# Patient Record
Sex: FEMALE | Race: BLACK OR AFRICAN AMERICAN | NOT HISPANIC OR LATINO
[De-identification: names, ages, dates, MRNs, and addresses within clinical notes are randomized per-mention and may not be internally consistent; named-entity substitution may affect disease eponyms.]

---

## 2018-08-23 PROBLEM — Z00.00 ENCOUNTER FOR PREVENTIVE HEALTH EXAMINATION: Status: ACTIVE | Noted: 2018-08-23

## 2018-08-24 ENCOUNTER — APPOINTMENT (OUTPATIENT)
Dept: PULMONOLOGY | Facility: CLINIC | Age: 63
End: 2018-08-24
Payer: MEDICARE

## 2018-08-24 VITALS
OXYGEN SATURATION: 99 % | DIASTOLIC BLOOD PRESSURE: 80 MMHG | TEMPERATURE: 98.6 F | SYSTOLIC BLOOD PRESSURE: 106 MMHG | BODY MASS INDEX: 45.99 KG/M2 | WEIGHT: 293 LBS | HEART RATE: 77 BPM | HEIGHT: 67 IN

## 2018-08-24 DIAGNOSIS — Z82.5 FAMILY HISTORY OF ASTHMA AND OTHER CHRONIC LOWER RESPIRATORY DISEASES: ICD-10-CM

## 2018-08-24 PROCEDURE — 99204 OFFICE O/P NEW MOD 45 MIN: CPT | Mod: 25

## 2018-08-24 PROCEDURE — 94060 EVALUATION OF WHEEZING: CPT

## 2018-08-24 PROCEDURE — 94727 GAS DIL/WSHOT DETER LNG VOL: CPT

## 2018-08-24 PROCEDURE — 95012 NITRIC OXIDE EXP GAS DETER: CPT

## 2018-08-24 PROCEDURE — ZZZZZ: CPT

## 2018-08-24 PROCEDURE — 94729 DIFFUSING CAPACITY: CPT

## 2018-08-29 ENCOUNTER — APPOINTMENT (OUTPATIENT)
Dept: PULMONOLOGY | Facility: CLINIC | Age: 63
End: 2018-08-29
Payer: MEDICARE

## 2018-08-29 PROCEDURE — 94070 EVALUATION OF WHEEZING: CPT

## 2018-08-29 PROCEDURE — 95070 INHLJ BRNCL CHALLENGE TSTG: CPT

## 2018-08-29 PROCEDURE — 71046 X-RAY EXAM CHEST 2 VIEWS: CPT

## 2018-09-26 ENCOUNTER — APPOINTMENT (OUTPATIENT)
Dept: BARIATRICS | Facility: CLINIC | Age: 63
End: 2018-09-26

## 2018-09-26 DIAGNOSIS — J45.909 UNSPECIFIED ASTHMA, UNCOMPLICATED: ICD-10-CM

## 2018-10-01 ENCOUNTER — APPOINTMENT (OUTPATIENT)
Dept: PULMONOLOGY | Facility: CLINIC | Age: 63
End: 2018-10-01

## 2018-10-03 ENCOUNTER — APPOINTMENT (OUTPATIENT)
Dept: BARIATRICS | Facility: CLINIC | Age: 63
End: 2018-10-03
Payer: MEDICARE

## 2018-10-03 VITALS
SYSTOLIC BLOOD PRESSURE: 135 MMHG | TEMPERATURE: 97.9 F | HEART RATE: 73 BPM | BODY MASS INDEX: 47.09 KG/M2 | HEIGHT: 66 IN | WEIGHT: 293 LBS | DIASTOLIC BLOOD PRESSURE: 84 MMHG | OXYGEN SATURATION: 98 %

## 2018-10-03 DIAGNOSIS — R06.02 SHORTNESS OF BREATH: ICD-10-CM

## 2018-10-03 DIAGNOSIS — M54.5 LOW BACK PAIN: ICD-10-CM

## 2018-10-03 DIAGNOSIS — Z80.3 FAMILY HISTORY OF MALIGNANT NEOPLASM OF BREAST: ICD-10-CM

## 2018-10-03 DIAGNOSIS — M19.90 UNSPECIFIED OSTEOARTHRITIS, UNSPECIFIED SITE: ICD-10-CM

## 2018-10-03 DIAGNOSIS — Z87.19 PERSONAL HISTORY OF OTHER DISEASES OF THE DIGESTIVE SYSTEM: ICD-10-CM

## 2018-10-03 DIAGNOSIS — Z83.49 FAMILY HISTORY OF OTHER ENDOCRINE, NUTRITIONAL AND METABOLIC DISEASES: ICD-10-CM

## 2018-10-03 DIAGNOSIS — Z83.3 FAMILY HISTORY OF DIABETES MELLITUS: ICD-10-CM

## 2018-10-03 DIAGNOSIS — Z82.49 FAMILY HISTORY OF ISCHEMIC HEART DISEASE AND OTHER DISEASES OF THE CIRCULATORY SYSTEM: ICD-10-CM

## 2018-10-03 DIAGNOSIS — Z83.6 FAMILY HISTORY OF OTHER DISEASES OF THE RESPIRATORY SYSTEM: ICD-10-CM

## 2018-10-03 DIAGNOSIS — Z87.09 PERSONAL HISTORY OF OTHER DISEASES OF THE RESPIRATORY SYSTEM: ICD-10-CM

## 2018-10-03 DIAGNOSIS — I83.93 ASYMPTOMATIC VARICOSE VEINS OF BILATERAL LOWER EXTREMITIES: ICD-10-CM

## 2018-10-03 DIAGNOSIS — M25.569 PAIN IN UNSPECIFIED KNEE: ICD-10-CM

## 2018-10-03 DIAGNOSIS — M79.89 OTHER SPECIFIED SOFT TISSUE DISORDERS: ICD-10-CM

## 2018-10-03 PROCEDURE — 99213 OFFICE O/P EST LOW 20 MIN: CPT

## 2018-11-29 ENCOUNTER — APPOINTMENT (OUTPATIENT)
Dept: ENDOCRINOLOGY | Facility: CLINIC | Age: 63
End: 2018-11-29
Payer: MEDICARE

## 2018-11-29 VITALS
BODY MASS INDEX: 47.09 KG/M2 | HEIGHT: 66 IN | SYSTOLIC BLOOD PRESSURE: 133 MMHG | HEART RATE: 93 BPM | DIASTOLIC BLOOD PRESSURE: 73 MMHG | WEIGHT: 293 LBS

## 2018-11-29 DIAGNOSIS — R03.0 ELEVATED BLOOD-PRESSURE READING, W/OUT DIAGNOSIS OF HYPERTENSION: ICD-10-CM

## 2018-11-29 PROCEDURE — 82962 GLUCOSE BLOOD TEST: CPT

## 2018-11-29 PROCEDURE — 99204 OFFICE O/P NEW MOD 45 MIN: CPT | Mod: 25

## 2018-11-29 PROCEDURE — 83036 HEMOGLOBIN GLYCOSYLATED A1C: CPT | Mod: QW

## 2018-11-30 PROBLEM — R03.0 ELEVATED BLOOD PRESSURE READING: Status: ACTIVE | Noted: 2018-11-30

## 2018-11-30 LAB
GLUCOSE BLDC GLUCOMTR-MCNC: 172
HBA1C MFR BLD HPLC: 5.9

## 2018-12-10 ENCOUNTER — OUTPATIENT (OUTPATIENT)
Dept: OUTPATIENT SERVICES | Facility: HOSPITAL | Age: 63
LOS: 1 days | End: 2018-12-10
Payer: MEDICARE

## 2018-12-10 DIAGNOSIS — J45.909 UNSPECIFIED ASTHMA, UNCOMPLICATED: ICD-10-CM

## 2018-12-10 PROCEDURE — 94070 EVALUATION OF WHEEZING: CPT

## 2018-12-10 PROCEDURE — 94070 EVALUATION OF WHEEZING: CPT | Mod: 26

## 2019-10-28 ENCOUNTER — APPOINTMENT (OUTPATIENT)
Dept: GASTROENTEROLOGY | Facility: CLINIC | Age: 64
End: 2019-10-28
Payer: MEDICARE

## 2019-10-28 VITALS
DIASTOLIC BLOOD PRESSURE: 82 MMHG | SYSTOLIC BLOOD PRESSURE: 124 MMHG | WEIGHT: 293 LBS | HEIGHT: 66 IN | HEART RATE: 73 BPM | OXYGEN SATURATION: 98 % | RESPIRATION RATE: 14 BRPM | BODY MASS INDEX: 47.09 KG/M2

## 2019-10-28 DIAGNOSIS — R10.9 UNSPECIFIED ABDOMINAL PAIN: ICD-10-CM

## 2019-10-28 DIAGNOSIS — Z12.11 ENCOUNTER FOR SCREENING FOR MALIGNANT NEOPLASM OF COLON: ICD-10-CM

## 2019-10-28 DIAGNOSIS — Z12.12 ENCOUNTER FOR SCREENING FOR MALIGNANT NEOPLASM OF COLON: ICD-10-CM

## 2019-10-28 PROCEDURE — 99204 OFFICE O/P NEW MOD 45 MIN: CPT

## 2019-10-29 NOTE — ASSESSMENT
[FreeTextEntry1] : 64yo F with WHO Class III Obesity presenting for evaluation of abdominal pain\par \par 1. Abdominal pain - Unclear etiology as patient unable to clearly verbalize symptoms. Seems to have systemic impact when these episodes occur. \par - H. pylori breath test\par - Trial of IB Guard\par \par 2. Colorectal Cancer Screening - Patient with family history, last colonoscopy >5 years ago. Given medical comorbidities, plan to pursue non-invasive testing\par - Cologuard ordered\par \par RTC after Cologuard results

## 2019-10-29 NOTE — HISTORY OF PRESENT ILLNESS
[de-identified] : Patient is a 64yo F presenting for evaluation of abdominal pain. Patient states that she has episodes of pain all over her body, including her back, knees, arms, associated with chills. No single focus in the abdomen that she can point to, no nausea or vomiting, no diarrhea or constipation. Patient denies melena or hematochezia, no change in stool frequency or consistency. Patient has taken Tylenol which helps these episodes. Patient otherwise iterates a history of colorectal cancer in her father who also had lymphoma and prostate cancer. No other GI malignancies in the family that she is aware of. Patient had been undergoing evaluation for potential bariatric surgery, but does not like the idea of rapid weight loss. Patient understands the potential health benefits, but is still hesitant.

## 2019-10-29 NOTE — PHYSICAL EXAM
[General Appearance - Alert] : alert [General Appearance - In No Acute Distress] : in no acute distress [Sclera] : the sclera and conjunctiva were normal [PERRL With Normal Accommodation] : pupils were equal in size, round, and reactive to light [Extraocular Movements] : extraocular movements were intact [Outer Ear] : the ears and nose were normal in appearance [Hearing Threshold Finger Rub Not Hernando] : hearing was normal [Examination Of The Oral Cavity] : the lips and gums were normal [Neck Cervical Mass (___cm)] : no neck mass was observed [Jugular Venous Distention Increased] : there was no jugular-venous distention [] : no respiratory distress [Respiration, Rhythm And Depth] : normal respiratory rhythm and effort [Exaggerated Use Of Accessory Muscles For Inspiration] : no accessory muscle use [Heart Rate And Rhythm] : heart rate was normal and rhythm regular [Abdomen Soft] : soft [Abdomen Tenderness] : non-tender [Nail Clubbing] : no clubbing  or cyanosis of the fingernails [Involuntary Movements] : no involuntary movements were seen [Skin Color & Pigmentation] : normal skin color and pigmentation [Skin Turgor] : normal skin turgor [No Focal Deficits] : no focal deficits [Oriented To Time, Place, And Person] : oriented to person, place, and time [Impaired Insight] : insight and judgment were intact [FreeTextEntry1] : Ambulates with rolling walker

## 2019-10-30 LAB — UREA BREATH TEST QL: NEGATIVE

## 2019-10-31 ENCOUNTER — RESULT REVIEW (OUTPATIENT)
Age: 64
End: 2019-10-31

## 2019-11-04 ENCOUNTER — OTHER (OUTPATIENT)
Age: 64
End: 2019-11-04

## 2019-11-15 ENCOUNTER — TRANSCRIPTION ENCOUNTER (OUTPATIENT)
Age: 64
End: 2019-11-15

## 2019-11-22 ENCOUNTER — APPOINTMENT (OUTPATIENT)
Dept: SLEEP CENTER | Facility: HOME HEALTH | Age: 64
End: 2019-11-22

## 2020-01-03 ENCOUNTER — OTHER (OUTPATIENT)
Age: 65
End: 2020-01-03

## 2021-04-29 ENCOUNTER — RESULT CHARGE (OUTPATIENT)
Age: 66
End: 2021-04-29

## 2021-04-29 ENCOUNTER — APPOINTMENT (OUTPATIENT)
Dept: ENDOCRINOLOGY | Facility: CLINIC | Age: 66
End: 2021-04-29
Payer: MEDICARE

## 2021-04-29 VITALS
WEIGHT: 293 LBS | HEART RATE: 76 BPM | BODY MASS INDEX: 47.09 KG/M2 | SYSTOLIC BLOOD PRESSURE: 133 MMHG | DIASTOLIC BLOOD PRESSURE: 79 MMHG | HEIGHT: 66 IN

## 2021-04-29 DIAGNOSIS — Z80.7 FAMILY HISTORY OF OTHER MALIGNANT NEOPLASMS OF LYMPHOID, HEMATOPOIETIC AND RELATED TISSUES: ICD-10-CM

## 2021-04-29 DIAGNOSIS — Z82.3 FAMILY HISTORY OF STROKE: ICD-10-CM

## 2021-04-29 LAB
ALBUMIN SERPL ELPH-MCNC: 4.4 G/DL
ALP BLD-CCNC: 89 U/L
ALT SERPL-CCNC: 17 U/L
ANION GAP SERPL CALC-SCNC: 10 MMOL/L
AST SERPL-CCNC: 16 U/L
BASOPHILS # BLD AUTO: 0.05 K/UL
BASOPHILS NFR BLD AUTO: 0.9 %
BILIRUB SERPL-MCNC: 0.3 MG/DL
BUN SERPL-MCNC: 15 MG/DL
CALCIUM SERPL-MCNC: 9.7 MG/DL
CHLORIDE SERPL-SCNC: 105 MMOL/L
CHOLEST SERPL-MCNC: 186 MG/DL
CO2 SERPL-SCNC: 28 MMOL/L
CREAT SERPL-MCNC: 0.65 MG/DL
EOSINOPHIL # BLD AUTO: 0.13 K/UL
EOSINOPHIL NFR BLD AUTO: 2.3 %
GLUCOSE BLDC GLUCOMTR-MCNC: 95
GLUCOSE SERPL-MCNC: 107 MG/DL
HBA1C MFR BLD HPLC: 5.8
HCT VFR BLD CALC: 41.1 %
HDLC SERPL-MCNC: 61 MG/DL
HGB BLD-MCNC: 13.2 G/DL
IMM GRANULOCYTES NFR BLD AUTO: 0.4 %
LDLC SERPL CALC-MCNC: 110 MG/DL
LYMPHOCYTES # BLD AUTO: 2.48 K/UL
LYMPHOCYTES NFR BLD AUTO: 43.5 %
MAN DIFF?: NORMAL
MCHC RBC-ENTMCNC: 30.6 PG
MCHC RBC-ENTMCNC: 32.1 GM/DL
MCV RBC AUTO: 95.1 FL
MONOCYTES # BLD AUTO: 0.51 K/UL
MONOCYTES NFR BLD AUTO: 8.9 %
NEUTROPHILS # BLD AUTO: 2.51 K/UL
NEUTROPHILS NFR BLD AUTO: 44 %
NONHDLC SERPL-MCNC: 125 MG/DL
PLATELET # BLD AUTO: 277 K/UL
POTASSIUM SERPL-SCNC: 4.4 MMOL/L
PROT SERPL-MCNC: 7.7 G/DL
RBC # BLD: 4.32 M/UL
RBC # FLD: 14.7 %
SODIUM SERPL-SCNC: 144 MMOL/L
TRIGL SERPL-MCNC: 75 MG/DL
WBC # FLD AUTO: 5.7 K/UL

## 2021-04-29 PROCEDURE — 99215 OFFICE O/P EST HI 40 MIN: CPT

## 2021-04-29 RX ORDER — ALBUTEROL SULFATE 90 UG/1
108 (90 BASE) AEROSOL, METERED RESPIRATORY (INHALATION)
Qty: 18 | Refills: 0 | Status: DISCONTINUED | COMMUNITY
Start: 2018-06-11 | End: 2021-04-29

## 2021-04-29 RX ORDER — DULAGLUTIDE 0.75 MG/.5ML
0.75 INJECTION, SOLUTION SUBCUTANEOUS
Refills: 0 | Status: DISCONTINUED | COMMUNITY
Start: 2021-04-29 | End: 2021-04-29

## 2021-04-29 RX ORDER — CETIRIZINE HYDROCHLORIDE 10 MG/1
10 TABLET, COATED ORAL
Qty: 90 | Refills: 0 | Status: DISCONTINUED | COMMUNITY
Start: 2018-06-11 | End: 2021-04-29

## 2021-04-29 RX ORDER — NAPROXEN 500 MG/1
500 TABLET ORAL
Qty: 60 | Refills: 0 | Status: DISCONTINUED | COMMUNITY
Start: 2018-10-19 | End: 2021-04-29

## 2021-04-29 RX ORDER — MONTELUKAST 10 MG/1
10 TABLET, FILM COATED ORAL
Qty: 30 | Refills: 0 | Status: DISCONTINUED | COMMUNITY
Start: 2018-06-11 | End: 2021-04-29

## 2021-04-29 RX ORDER — FLUTICASONE PROPIONATE 50 UG/1
50 SPRAY, METERED NASAL
Qty: 16 | Refills: 0 | Status: DISCONTINUED | COMMUNITY
Start: 2018-08-17 | End: 2021-04-29

## 2021-04-29 RX ORDER — TOPIRAMATE 25 MG/1
25 TABLET, FILM COATED ORAL
Refills: 0 | Status: DISCONTINUED | COMMUNITY
Start: 2021-04-29 | End: 2021-04-29

## 2021-04-29 RX ORDER — LATANOPROST/PF 0.005 %
0.01 DROPS OPHTHALMIC (EYE)
Refills: 0 | Status: DISCONTINUED | COMMUNITY
End: 2021-04-29

## 2021-04-29 RX ORDER — CICLOPIROX 80 MG/ML
8 SOLUTION TOPICAL
Qty: 6 | Refills: 0 | Status: DISCONTINUED | COMMUNITY
Start: 2017-10-04 | End: 2021-04-29

## 2021-04-29 RX ORDER — LATANOPROST/PF 0.005 %
0.01 DROPS OPHTHALMIC (EYE)
Qty: 2 | Refills: 0 | Status: DISCONTINUED | COMMUNITY
Start: 2018-01-03 | End: 2021-04-29

## 2021-05-04 LAB
25(OH)D3 SERPL-MCNC: 28.7 NG/ML
T4 FREE SERPL-MCNC: 1 NG/DL
THYROGLOB AB SERPL-ACNC: <20 IU/ML
THYROPEROXIDASE AB SERPL IA-ACNC: 20.2 IU/ML
TSH SERPL-ACNC: 2.38 UIU/ML
VIT B12 SERPL-MCNC: 1093 PG/ML

## 2021-05-04 NOTE — ASSESSMENT
[FreeTextEntry1] : 65 year old female with chief complaint lightheadedness presents for follow up.  She has prediabetes and is morbidly obese.\par \par 1) Lightheadedness also with headaches, congestion of the ears, hx allergies\par Differential includes allergies v infection v hypotension v hypoglycemia \par Most likely and fitting explanation seems to be allergies especially given her history of PE findings\par Her BG and BP are within normal limits today.  A1C in prediabetes range\par I recommended she schedule an appointment with allergy and gave her contact info for Dr Yossi Alfonso at [facility].\par Also recommended one week trial of zyrtec \par I do not think there is an endocrine cause of her lightheadedness \par \par 2) Prediabetes \par FH of diabetes in her mother\par A1C is 5.8% today based on POC testing which is stable since Nov 2018\par Discussed this diagnosis with her.  Reviewed treatment options including lifestyle changes and medical options.  I am concerned that she has not been able to lose significant weight since she saw Dr Pineda in 2018 and discussed lifestyle modifications.\par Her A1C is stable so medication is not imperative, however, I am concerned about her weight so would consider medical interventions for both A1C and obesity. See obesity discussion below\par \par 3) Obesity, Morbid: Class III, complicated by severe DJD\par Needs to focus with diet and lifestyle modifications for weight loss.  her mobility is severely limited secondary to DJD so she is not able to perform much exercise.  \par She is concerned about side effects with metformin.  Recommended GLP1 agonist (discussed treatment in depth including side effects and printed patient information for Trulicity) or topprimate (less weight loss potential but administered orally and also would potentially help to treat or avoid headaches, also gave printed patient info).  Patient will consider her options and let me know if she wants to proceed with initiating Rx.\par Not pursuing bariatric surgery at this time \par \par 4) ?thyroid dysfunction\par Salivary results are likely inaccurate.  No labs available for my review today\par Repeat thyroid function tests now including anti-thyroid antibodies to assess for thyroid dysfunction \par \par 5) Crown hair loss\par Check thyroid function as above, vitamin B12, vitamin D, CBC for anemia \par \par Return to clinic as scheduled with Dr Worley \par

## 2021-05-04 NOTE — HISTORY OF PRESENT ILLNESS
[FreeTextEntry1] : 65 year old female with PMH significant for DJD who presents today for follow up of morbid obesity, prediabetes, ?thyroid dysfunction.  \par She was initially seen by Dr Pineda in November 2018 for obesity.  At that time, lifestyle modification was encouraged.  Today, she states she is returning to care because she has been feeling intermittently lightheaded and needs to make sure something is not wrong.  She has seen her PCP.  She reports that the lightheadedness occurs intermittently, about once per month.  She has not had her period for many years, but the timing of it seems to correspond with when she would have her menstrual cycle.  Also reports she feels lethargic, heaviness in her head, gets headaches occurring approximately once per month.\par She saw a nutritionist who reported she had thyroid hormone deficiency based on a salivary test.  Her PCP repeated a blood test and said she was euthyroid.\par She otherwise reports acne, thinning crown hair, temperature intolerance (mostly hot but cold at night), occasional loose stools, nocturia x once nightly, fatigue, allergies.

## 2021-05-04 NOTE — REASON FOR VISIT
[Initial Evaluation] : an initial evaluation [Weight Management/Obesity] : weight management/obesity [Other___] : [unfilled] [FreeTextEntry2] : Dr Call

## 2021-05-04 NOTE — ADDENDUM
[FreeTextEntry1] : 5/4/2021 [facility] Spoke with patient re labs.  Thyroid function normal, vitamin D fine, not anemic.  LDL cholesterol is slightly elevated and ASCVD risk is 7.2% Borderline Current 10-Year ASCVD Risk.  Recommended decreasing animal fats and saturated fats in her diet.

## 2021-06-08 ENCOUNTER — APPOINTMENT (OUTPATIENT)
Dept: ENDOCRINOLOGY | Facility: CLINIC | Age: 66
End: 2021-06-08
Payer: MEDICARE

## 2021-06-08 VITALS
BODY MASS INDEX: 50.84 KG/M2 | DIASTOLIC BLOOD PRESSURE: 77 MMHG | HEART RATE: 79 BPM | SYSTOLIC BLOOD PRESSURE: 132 MMHG | WEIGHT: 293 LBS

## 2021-06-08 LAB
GLUCOSE BLDC GLUCOMTR-MCNC: 99
HBA1C MFR BLD HPLC: 5.7

## 2021-06-08 PROCEDURE — 83036 HEMOGLOBIN GLYCOSYLATED A1C: CPT | Mod: QW

## 2021-06-08 PROCEDURE — 99215 OFFICE O/P EST HI 40 MIN: CPT | Mod: 25

## 2021-06-08 PROCEDURE — 82962 GLUCOSE BLOOD TEST: CPT

## 2021-06-08 NOTE — PHYSICAL EXAM
[Alert] : alert [Healthy Appearance] : healthy appearance [No Acute Distress] : no acute distress [Normal Sclera/Conjunctiva] : normal sclera/conjunctiva [Normal Hearing] : hearing was normal [No Neck Mass] : no neck mass was observed [No LAD] : no lymphadenopathy [Supple] : the neck was supple [Thyroid Not Enlarged] : the thyroid was not enlarged [No Respiratory Distress] : no respiratory distress [Clear to Auscultation] : lungs were clear to auscultation bilaterally [Normal S1, S2] : normal S1 and S2 [Normal Rate] : heart rate was normal [Regular Rhythm] : with a regular rhythm [No Stigmata of Cushings Syndrome] : no stigmata of Cushings Syndrome [Acanthosis Nigricans] : acanthosis nigricans present [Normal Insight/Judgement] : insight and judgment were intact [Kyphosis] : no kyphosis present [de-identified] : no moon facies, no supraclavicular fat pads [de-identified] : narrow-based gait with walker

## 2021-06-08 NOTE — HISTORY OF PRESENT ILLNESS
[FreeTextEntry1] : Ms. Zaldivar is a 65 year-old woman with a history of elevated body mass index and elevated hemoglobin A1c presenting to establish care with me. She saw Rio Quintana NP in April 2021 and Dr. Jeb Pineda in November 2018.\par \par Elevated body mass index. Comorbidities of elevated HbA1c and osteoarthritis. Point-of-care HbA1c 5.7% and glucose 99 mg/dL today.\par Her early adult weight was around 140 pounds. She was in good shape when exercising. She gained weight during her two pregnancies. She weighed around 200 pounds prior to an injury at work in 2016. She has gained weight progressively since that time. \par She has followed with nutrition in the past but not currently. \par She has a remote history of pharmacologic therapy for weight loss in the past but does not remember the name.\par 24 hour diet recall: breakfast, does not remember; lunch, does not remember; dinner, slice of pizza, salad without dressing; no sodas, juices, sweetened beverages\par Exercise: Was participating in physical therapy before the pandemic; limited\par \par Her sister and mother are living with her. Her mother had a stroke earlier this year. She has had lightheadedness.

## 2021-06-08 NOTE — ASSESSMENT
[FreeTextEntry1] : Elevated body mass index. Comorbidities of elevated HbA1c and osteoarthritis. We reviewed lifestyle modifications for weight loss. We discussed referral to nutrition. We discussed pharmacologic options for weight loss. She is hesitant to consider pharmacologic therapy at this time but may consider a GLP-1 receptor agonist next visit. We discussed the risks and benefits of the GLP-1 receptor agonist class, including but not limited to nausea, pancreatitis, medullary thyroid cancer. We reviewed subcutaneous injection technique, storage, and sharps disposal. \par Lifestyle modification\par Referral to nutrition \par \par Lightheadedness. I advised she discuss with her primary care provider.\par \par Return to see me in 3 months.\par \par CC:\par Dr. Sandy Call, Fax [phone]

## 2021-06-24 ENCOUNTER — APPOINTMENT (OUTPATIENT)
Dept: ENDOCRINOLOGY | Facility: CLINIC | Age: 66
End: 2021-06-24
Payer: MEDICARE

## 2021-06-24 PROCEDURE — 97802 MEDICAL NUTRITION INDIV IN: CPT

## 2021-09-08 ENCOUNTER — APPOINTMENT (OUTPATIENT)
Dept: ENDOCRINOLOGY | Facility: CLINIC | Age: 66
End: 2021-09-08

## 2022-07-15 ENCOUNTER — APPOINTMENT (OUTPATIENT)
Dept: OTOLARYNGOLOGY | Facility: CLINIC | Age: 67
End: 2022-07-15

## 2022-07-15 VITALS
HEART RATE: 71 BPM | SYSTOLIC BLOOD PRESSURE: 136 MMHG | HEIGHT: 66 IN | OXYGEN SATURATION: 98 % | TEMPERATURE: 97.7 F | DIASTOLIC BLOOD PRESSURE: 83 MMHG

## 2022-07-15 DIAGNOSIS — R09.81 NASAL CONGESTION: ICD-10-CM

## 2022-07-15 DIAGNOSIS — J31.0 CHRONIC RHINITIS: ICD-10-CM

## 2022-07-15 DIAGNOSIS — R49.0 DYSPHONIA: ICD-10-CM

## 2022-07-15 PROCEDURE — 99204 OFFICE O/P NEW MOD 45 MIN: CPT | Mod: 25

## 2022-07-15 PROCEDURE — 31575 DIAGNOSTIC LARYNGOSCOPY: CPT

## 2022-07-15 RX ORDER — MOMETASONE 50 UG/1
50 SPRAY, METERED NASAL DAILY
Qty: 2 | Refills: 5 | Status: ACTIVE | COMMUNITY
Start: 2022-07-15 | End: 1900-01-01

## 2022-07-15 RX ORDER — PANTOPRAZOLE 40 MG/1
40 TABLET, DELAYED RELEASE ORAL DAILY
Qty: 30 | Refills: 3 | Status: ACTIVE | COMMUNITY
Start: 2022-07-15 | End: 1900-01-01

## 2022-07-15 RX ORDER — LEVOCETIRIZINE DIHYDROCHLORIDE 5 MG/1
5 TABLET ORAL DAILY
Qty: 1 | Refills: 3 | Status: ACTIVE | COMMUNITY
Start: 2022-07-15 | End: 1900-01-01

## 2022-07-15 NOTE — PROCEDURE
[Flexible Endoscope] : examined with the flexible endoscope [Normal] : the false vocal folds were pink and regular, the ventricular sulcus was open, the true vocal folds were glistening white, tense and of equal length, mobility, and height [True Vocal Cords Erythematous] : bilateral true vocal cord edema [Glottis Arytenoid Cartilages Erythema] : bilateral arytenoid ~M erythema [Interarytenoid Edema] : interarytenoid area edematous

## 2022-07-15 NOTE — HISTORY OF PRESENT ILLNESS
[de-identified] : 66 year old female with chronic headaches presents with forehead pressure for several years. This would occur 1-2 x a month. Also with post nasal drip and voice hoarseness. Occasional nasal congestion. She started using saline navage which improved her symptoms. Used Afrin in the past. No drainage or changes in sense of taste or smell. Seen by ENT in the past for this issue, maybe had CT Sinus. No dysphagia or dyspnea.

## 2022-07-15 NOTE — REASON FOR VISIT
[Initial Consultation] : an initial consultation for [FreeTextEntry2] : facial pressure and hoarseness

## 2022-07-15 NOTE — PHYSICAL EXAM
[Normal] : mucosa is normal [Midline] : trachea located in midline position [Laryngoscopy Performed] : laryngoscopy was performed, see procedure section for findings [] : septum deviated bilaterally [de-identified] : cerumen left  [de-identified] : edema

## 2022-09-23 ENCOUNTER — APPOINTMENT (OUTPATIENT)
Dept: OTOLARYNGOLOGY | Facility: CLINIC | Age: 67
End: 2022-09-23

## 2022-10-13 ENCOUNTER — OUTPATIENT (OUTPATIENT)
Dept: OUTPATIENT SERVICES | Facility: HOSPITAL | Age: 67
LOS: 1 days | End: 2022-10-13
Payer: MEDICARE

## 2022-10-13 ENCOUNTER — APPOINTMENT (OUTPATIENT)
Dept: ORTHOPEDIC SURGERY | Facility: CLINIC | Age: 67
End: 2022-10-13

## 2022-10-13 ENCOUNTER — RESULT REVIEW (OUTPATIENT)
Age: 67
End: 2022-10-13

## 2022-10-13 VITALS — WEIGHT: 293 LBS | HEIGHT: 66 IN | BODY MASS INDEX: 47.09 KG/M2

## 2022-10-13 PROCEDURE — 99204 OFFICE O/P NEW MOD 45 MIN: CPT

## 2022-10-13 PROCEDURE — 73562 X-RAY EXAM OF KNEE 3: CPT | Mod: 26,50

## 2022-10-13 PROCEDURE — 73562 X-RAY EXAM OF KNEE 3: CPT

## 2022-10-13 RX ORDER — CELECOXIB 200 MG/1
200 CAPSULE ORAL
Qty: 30 | Refills: 0 | Status: ACTIVE | COMMUNITY
Start: 2022-10-13 | End: 1900-01-01

## 2022-10-13 NOTE — PHYSICAL EXAM
[de-identified] : Left knee previous arthroscopic portal sites well-healed there is warmth about the knee definite medial joint line tenderness range of motion 0 to 95 degrees.  She is neurovascular intact the knee is stable to AP stress varus valgus stress in both full extension and 90 degrees of flexion. [de-identified] : AP standing individual lateral and sunrise views were obtained showing advanced bilateral knee osteoarthritis with nearly 90% cartilage loss on the medial aspect of both knees on standing AP projection.  Mild varus clinician also noted approximately 15 degrees.  Early secondary findings also noted in the medial compartment of both knees such as early osteophyte and cyst formation.

## 2022-10-13 NOTE — DISCUSSION/SUMMARY
[Medication Risks Reviewed] : Medication risks reviewed [de-identified] : A long discussion with the patient about the underlying etiology of her bilateral right greater than left knee pain.  Patient has diagnosis of severe osteoarthritis medial compartment both knees.  We talked at length about the importance to her reduce her current BMI of 50.  We talked at length about the potential use of Ozempic and she is to discuss this with her primary care physician.   we spoke at length about how reducing her BMI to a target of 40 will help reduce her symptoms and will also help reduce the incidence of perioperative complications when knee replacement surgery may be undertaken.  As a temporizing measure we talked about anti-inflammatory use and suggested I will prescribe Celebrex 200 mg p.o. twice daily for 5-day course then to be taken thereafter as needed.  Reasonable risk and benefits of medication were discussed at length including potential complications.  Patient's current medications were reviewed and there is no contraindication to their intermittent use.  Patient defers on cortisone and/or HA injections stating that she has had those in the past and is quite significant increase in her blood pressure.\par \par Plan.  Patient is encouraged to discuss use of Ozempic to help reduce her BMI and reduce her overall symptoms.  Patient will be placed on Celebrex 200 mg p.o. twice daily for 5-day course to be taken thereafter as needed she will follow-up in 1 month's time for repeat assessment.

## 2022-10-13 NOTE — REASON FOR VISIT
[Initial Visit] : an initial visit for [Knee Pain] : knee pain [FreeTextEntry2] : ANDERS knee pain. History of arthroscopy surgery in both knees. History of constant falling. Lt knee gives out under her constantly. Pt states she tried the gel injections but it did not work out. Due to her blood pressure being raised so high.

## 2022-10-13 NOTE — HISTORY OF PRESENT ILLNESS
[de-identified] : First-time visit for this 66-year-old female with complaint of left knee medial sided pain.  Patient states she has pain going up and down steps she was put on moderate amount of weight in the last year and a half due to COVID inactivity.  She ambulates with a rolling walker.  Patient also has difficulty with prolonged standing.  She takes Tylenol for the pain which affords her only mild temporary symptomatic improvement.  Patient recalls no specific recent accident or injury but does have a history of a work-related injury and arthroscopic surgery in 2016 for this left knee.

## 2022-10-14 ENCOUNTER — APPOINTMENT (OUTPATIENT)
Dept: ENDOCRINOLOGY | Facility: CLINIC | Age: 67
End: 2022-10-14

## 2022-10-14 VITALS
HEART RATE: 76 BPM | DIASTOLIC BLOOD PRESSURE: 81 MMHG | WEIGHT: 293 LBS | BODY MASS INDEX: 51.81 KG/M2 | SYSTOLIC BLOOD PRESSURE: 145 MMHG

## 2022-10-14 LAB
GLUCOSE BLDC GLUCOMTR-MCNC: 97
HBA1C MFR BLD HPLC: 5.6

## 2022-10-14 PROCEDURE — 82962 GLUCOSE BLOOD TEST: CPT

## 2022-10-14 PROCEDURE — 99214 OFFICE O/P EST MOD 30 MIN: CPT | Mod: 25

## 2022-10-14 PROCEDURE — 83036 HEMOGLOBIN GLYCOSYLATED A1C: CPT | Mod: QW

## 2022-10-18 LAB
25(OH)D3 SERPL-MCNC: 29.9 NG/ML
ALBUMIN SERPL ELPH-MCNC: 4.5 G/DL
ALP BLD-CCNC: 89 U/L
ALT SERPL-CCNC: 19 U/L
ANION GAP SERPL CALC-SCNC: 11 MMOL/L
AST SERPL-CCNC: 14 U/L
BILIRUB SERPL-MCNC: 0.4 MG/DL
BUN SERPL-MCNC: 17 MG/DL
CALCIUM SERPL-MCNC: 9.4 MG/DL
CHLORIDE SERPL-SCNC: 103 MMOL/L
CHOLEST SERPL-MCNC: 197 MG/DL
CO2 SERPL-SCNC: 27 MMOL/L
CREAT SERPL-MCNC: 0.71 MG/DL
EGFR: 94 ML/MIN/1.73M2
ESTIMATED AVERAGE GLUCOSE: 123 MG/DL
GLUCOSE SERPL-MCNC: 97 MG/DL
HBA1C MFR BLD HPLC: 5.9 %
HDLC SERPL-MCNC: 56 MG/DL
LDLC SERPL CALC-MCNC: 125 MG/DL
NONHDLC SERPL-MCNC: 140 MG/DL
POTASSIUM SERPL-SCNC: 4.4 MMOL/L
PROT SERPL-MCNC: 7.4 G/DL
SODIUM SERPL-SCNC: 141 MMOL/L
T4 FREE SERPL-MCNC: 1 NG/DL
TRIGL SERPL-MCNC: 75 MG/DL
TSH SERPL-ACNC: 1.94 UIU/ML

## 2022-10-18 NOTE — ASSESSMENT
[FreeTextEntry1] : 1. obesity\par Obesity, class 3\par Current weight 321lb with BMI 51.81\par Highest weight currently\par Lowest weight in young adulthood, pre-pregnancy 140s\par JUNO has made dietary attempts, as well as exercise attempts for weight loss with limited benefit.  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 \par Reviewed oral and injectable therapy options today with their benefits (inclusive of weight loss expectations: orlistat ~3-4% at 1 year; Qsymia ~6.6-8.6% at 1 year, >70% achieve this; Contrave 4.8% at 1 year <50% achieve this and Saxenda ~50-60% achieve >5% loss at 1 year), risks and administration.\par \par Patient today is apprehensive towards pharmacologic management for weight loss.  She is disinterested in surgical management, as does not desire excess skin if losing weight too quickly.  She requests a list of all FDA approved antiobesity medication to research, which was provided today.  Her preference to start is to revisit the nutritionist, now that she feels that she has more time to focus on her weight loss journey/care for self.\par -- labs today\par \par 2. Prediabetes\par A1C: 5.6% (10/14/22 on POCT) from 5.7% (3/2022)\par -- repeat with labs\par \par 3. vitamin D deficiency\par Repeat with labs\par \par Labs today, I will call/[facility] message with results\par Follow up\par \par Ratna Madden, MS, FNP-BC, [facility]\par 10/14/2022

## 2022-10-18 NOTE — PHYSICAL EXAM
[Alert] : alert [Obese] : obese [No Acute Distress] : no acute distress [Normal Hearing] : hearing was normal [No Respiratory Distress] : no respiratory distress [Normal Rate and Effort] : normal respiratory rate and effort [Clear to Auscultation] : lungs were clear to auscultation bilaterally [Normal PMI] : the apical impulse was normal [Normal S1, S2] : normal S1 and S2 [Regular Rhythm] : with a regular rhythm [Oriented x3] : oriented to person, place, and time [Normal Insight/Judgement] : insight and judgment were intact [Normal Mood] : the mood was normal [de-identified] : Ambulates with walker--steady

## 2022-10-18 NOTE — HISTORY OF PRESENT ILLNESS
[FreeTextEntry1] : Ms. GOTTI is a 66 year female with pmhx of obesity, prediabetes, osteoarthritis who presents for follow-up.\par \par Patient of Dr. 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 and care for her mother, she is currently empowered for focus on herself, but also notes that her daughter needs her support, which is now taking time away from her too\par \par A1C - 5.6% (10/14/22 on POCT) from 5.7% (3/2022)\par Office Fingerstick -- 97 (not fasting)\par \par Current diabetes medication:\par - NONE\par \par Past diabetes medication:\par - NONE\par \par Diet--\par Typically consumes 2-3 meals/daily, +/- snacks\par Eats typically between hours of 11A and 7P\par \par Exercise--\par Limited given OA\par Uses walker for ambulation\par \par Weight management history:\par Endorses that she started to struggle with her weight during/after her pregnancies.  Prior to an injury in 2016, which limited her exercise, +depression, per patient, she weighed ~200lb.  Since this time, her weight has increased

## 2022-10-18 NOTE — ADDENDUM
[FreeTextEntry1] : 10/18/22, addendum, SG--\par Called and reviewed labs with patient\par A1C 5.9%, continue plan for weight loss, diet modification and walking as tolerated\par Chol ok with , weight loss will assist this metabolic parameter as well\par TFTs WNL\par vit D 29.9 cont supplement, consistency especially during winter months\par CMP WNL\par \par Meeting with nutritionist closer to her home today, en route to appt as we spoke on phone

## 2022-11-10 ENCOUNTER — APPOINTMENT (OUTPATIENT)
Dept: ENDOCRINOLOGY | Facility: CLINIC | Age: 67
End: 2022-11-10

## 2023-07-19 ENCOUNTER — APPOINTMENT (OUTPATIENT)
Dept: ENDOCRINOLOGY | Facility: CLINIC | Age: 68
End: 2023-07-19
Payer: MEDICARE

## 2023-07-19 VITALS
BODY MASS INDEX: 48.26 KG/M2 | WEIGHT: 293 LBS | HEART RATE: 73 BPM | SYSTOLIC BLOOD PRESSURE: 136 MMHG | DIASTOLIC BLOOD PRESSURE: 77 MMHG

## 2023-07-19 LAB
GLUCOSE BLDC GLUCOMTR-MCNC: 121
HBA1C MFR BLD HPLC: 5.7

## 2023-07-19 PROCEDURE — 82962 GLUCOSE BLOOD TEST: CPT

## 2023-07-19 PROCEDURE — 83036 HEMOGLOBIN GLYCOSYLATED A1C: CPT | Mod: QW

## 2023-07-19 PROCEDURE — 99213 OFFICE O/P EST LOW 20 MIN: CPT | Mod: 25

## 2023-07-19 NOTE — PHYSICAL EXAM
[Alert] : alert [Obese] : obese [No Acute Distress] : no acute distress [Normal Hearing] : hearing was normal [No Respiratory Distress] : no respiratory distress [Normal Rate and Effort] : normal respiratory rate and effort [Clear to Auscultation] : lungs were clear to auscultation bilaterally [Normal PMI] : the apical impulse was normal [Normal S1, S2] : normal S1 and S2 [Regular Rhythm] : with a regular rhythm [Oriented x3] : oriented to person, place, and time [Normal Insight/Judgement] : insight and judgment were intact [Normal Mood] : the mood was normal [de-identified] : Ambulates with walker--steady

## 2023-07-19 NOTE — HISTORY OF PRESENT ILLNESS
[FreeTextEntry1] : Ms. GOTTI is a 66 year female with pmhx of obesity, prediabetes, osteoarthritis who presents for follow-up.\par \par Patient of Dr. Worley, last visit, 6/8/2021\par Has also seen other providers in our office: Rio Quintana NP (4/2021) and Dr Pineda (2018)\par Last visit, 10/14/2022 with myself\par \par Interval change:\par Since last visit has lost 22lb in setting of +stress (recent passing of mother 5/2022, daughter in [place] with hurricane and her  lef, helping children) and lifestyle modifications (following more vegan diet)\par Cooking mostly for herself\par Some limited physical activity r/t mobility\par Access-a-ride stopped since last visit, causing delay for FU, has been a barrier for all doctor follow-up, as paying ~40-50$ for transit + copay\par Apprehensive re: pharmacologic therapy, prefers continuation of diet/lifestyle management\par \par \par A1C - 5.7% (7/19/23) from 5.9% (10/14/22) from 5.7% (3/2022)\par Office Fingerstick -- 121 (2H postprandial, ate began meatballs and veggies with teriyaki sauce and apple crumb pie)\par \par Current diabetes medication:\par - NONE\par \par Past diabetes medication:\par - NONE\par \par Diet--\par Typically consumes 2-3 meals/daily, +/- snacks\par Eats typically between hours of 11A and 7P\par \par Exercise--\par Limited given OA\par Uses walker for ambulation\par \par Weight management history:\par Endorses that she started to struggle with her weight during/after her pregnancies.  Prior to an injury in 2016, which limited her exercise, +depression, per patient, she weighed ~200lb.  Since this time, her weight has increased

## 2023-07-19 NOTE — ASSESSMENT
[FreeTextEntry1] : 1. obesity\par Obesity, class 3\par Current weight 299lb with BMI 48.26 (Loss of 6.85% TBW since last visit)\par Previous weight 321lb with BMI 51.81 (also her highest weight)\par Lowest weight in young adulthood, pre-pregnancy 140s\par Associated comorbidities: prediabetes \par JUNO has made dietary attempts, as well as exercise attempts for weight loss with more benefit over the past months than previously, which she attributes partially being related to lifestyle changes, but also related to stress.   She continues to prefer approaching weight management with lifestyle modifications and is currently not interested in pharmacologic management for weight loss.  She is also disinterested in surgical management, as does not desire excess skin if losing weight too quickly. \par -- continue lifestyle modification\par \par 2. Prediabetes\par A1C: 5.7% (7/19/23) from 5.9% (10/14/22) from 5.7% (3/2022)\par Continue weight loss promotion and lifestyle modification\par \par 3. vitamin D deficiency\par 10/2022 vitD 29.9\par Supplementing more consistently\par \par Labs today, I will call/[facility] message with results\par Follow up in 6 months\par \par Ratna Madden MS, FNP-BC, [facility]\par 07/19/2023

## 2023-07-19 NOTE — REVIEW OF SYSTEMS
[Recent Weight Loss (___ Lbs)] : recent weight loss: [unfilled] lbs [Negative] : Heme/Lymph [Fatigue] : fatigue

## 2023-11-15 ENCOUNTER — APPOINTMENT (OUTPATIENT)
Dept: PULMONOLOGY | Facility: CLINIC | Age: 68
End: 2023-11-15
Payer: MEDICARE

## 2023-11-15 VITALS
DIASTOLIC BLOOD PRESSURE: 82 MMHG | TEMPERATURE: 97.8 F | SYSTOLIC BLOOD PRESSURE: 174 MMHG | HEART RATE: 95 BPM | OXYGEN SATURATION: 99 % | HEIGHT: 66 IN

## 2023-11-15 DIAGNOSIS — G47.19 OTHER HYPERSOMNIA: ICD-10-CM

## 2023-11-15 PROCEDURE — 99204 OFFICE O/P NEW MOD 45 MIN: CPT

## 2023-12-07 ENCOUNTER — OUTPATIENT (OUTPATIENT)
Dept: OUTPATIENT SERVICES | Facility: HOSPITAL | Age: 68
LOS: 1 days | End: 2023-12-07
Payer: MEDICARE

## 2023-12-07 ENCOUNTER — APPOINTMENT (OUTPATIENT)
Dept: SLEEP CENTER | Facility: HOME HEALTH | Age: 68
End: 2023-12-07
Payer: MEDICARE

## 2023-12-07 PROCEDURE — 95800 SLP STDY UNATTENDED: CPT

## 2023-12-07 PROCEDURE — 95800 SLP STDY UNATTENDED: CPT | Mod: 26

## 2023-12-08 DIAGNOSIS — G47.33 OBSTRUCTIVE SLEEP APNEA (ADULT) (PEDIATRIC): ICD-10-CM

## 2023-12-21 ENCOUNTER — APPOINTMENT (OUTPATIENT)
Dept: PULMONOLOGY | Facility: CLINIC | Age: 68
End: 2023-12-21
Payer: MEDICARE

## 2023-12-21 VITALS
HEART RATE: 82 BPM | HEIGHT: 66 IN | BODY MASS INDEX: 47.09 KG/M2 | TEMPERATURE: 96.6 F | WEIGHT: 293 LBS | DIASTOLIC BLOOD PRESSURE: 81 MMHG | OXYGEN SATURATION: 99 % | SYSTOLIC BLOOD PRESSURE: 130 MMHG

## 2023-12-21 DIAGNOSIS — G47.00 INSOMNIA, UNSPECIFIED: ICD-10-CM

## 2023-12-21 PROCEDURE — 99213 OFFICE O/P EST LOW 20 MIN: CPT

## 2023-12-21 NOTE — ASSESSMENT
[FreeTextEntry1] : I do not think this level of sleep disordered breathing is significant.  Her sleep habits are highly variable. Discussed sleep hygiene at length.  Suggested mild sleep restriction- adjust bed hours to expected sleep duration. If not sleepy, get out of bed.  Keep consistent wake time no matter how poor perceived sleep is.  Relaxing time before bed, "worry time" before that.  Suggested sleep schedule of 11 pm bedtime, 6  am wake time.

## 2023-12-21 NOTE — HISTORY OF PRESENT ILLNESS
[FreeTextEntry1] : 11/15/2023 :  JUNO GOTTI is a 67 year old female who is being evaluated for chief complaint of daytime sleepiness.  She describes poor nighttime sleep.  Usual bedtime 3 AM, latency 10 minutes, at least 2 awakenings on a typical night, up at 9:30 AM.  She is told of severe snoring, apnea is not specifically observed, she often awakens with a headache.  She describes significant daytime sleepiness, [place] sleepiness score 14.  She is morbidly obese, last recorded BMI 48, refused weight today.  Sleep symptoms have been gradually worsening over the last few years.  12/23/2021: Follow-up visit after home sleep study.  Reviewed home sleep study with patient: She slept for only about 3 and half hours out of 6 hours recording time, she tells me she was not feeling well that night had symptoms of a cold.  She had minimal sleep disordered breathing, apnea-hypopnea index 4.2 (4%), less than 1 minute below 89% saturation.  Her sleep time was actually from about midnight to about 5 AM, which is not her usual schedule.

## 2023-12-21 NOTE — PHYSICAL EXAM
[General Appearance - Well Developed] : well developed [Normal Appearance] : normal appearance [Well Groomed] : well groomed [General Appearance - Well Nourished] : well nourished [No Deformities] : no deformities [General Appearance - In No Acute Distress] : no acute distress [Affect] : the affect was normal [FreeTextEntry1] : Awake, alert

## 2024-02-20 ENCOUNTER — APPOINTMENT (OUTPATIENT)
Dept: ENDOCRINOLOGY | Facility: CLINIC | Age: 69
End: 2024-02-20

## 2024-02-22 ENCOUNTER — APPOINTMENT (OUTPATIENT)
Dept: OTOLARYNGOLOGY | Facility: CLINIC | Age: 69
End: 2024-02-22
Payer: MEDICARE

## 2024-02-22 VITALS
SYSTOLIC BLOOD PRESSURE: 136 MMHG | WEIGHT: 293 LBS | DIASTOLIC BLOOD PRESSURE: 72 MMHG | HEIGHT: 66 IN | BODY MASS INDEX: 47.09 KG/M2 | HEART RATE: 80 BPM

## 2024-02-22 DIAGNOSIS — R09.82 POSTNASAL DRIP: ICD-10-CM

## 2024-02-22 DIAGNOSIS — K21.9 GASTRO-ESOPHAGEAL REFLUX DISEASE W/OUT ESOPHAGITIS: ICD-10-CM

## 2024-02-22 PROCEDURE — 99214 OFFICE O/P EST MOD 30 MIN: CPT | Mod: 25

## 2024-02-22 PROCEDURE — 31575 DIAGNOSTIC LARYNGOSCOPY: CPT

## 2024-02-22 RX ORDER — OMEPRAZOLE 40 MG/1
40 CAPSULE, DELAYED RELEASE ORAL
Qty: 90 | Refills: 0 | Status: ACTIVE | COMMUNITY
Start: 2024-02-22 | End: 1900-01-01

## 2024-02-22 NOTE — HISTORY OF PRESENT ILLNESS
[de-identified] : 68F here for initial visit.  She c/o hoarse voice for the past month. This started, and persists, after a bad URI with violent coughing, which has since passed. At baseline, she has long standing postnasal drip and mucus, worse in the morning and also when laying down flat.  ROS otherwise unremarkable.

## 2024-02-22 NOTE — PROCEDURE
[FreeTextEntry3] : Nasal Endoscopy: inferior turbinate hypertrophy bilateral septal deviation/spurring choana clear  Fiberoptic Laryngoscopy: upper airway widely patent TVF fully mobule small hemorrhage vs hemorrhagic polyp over free edge left TVF w contralateral right sided thickening at contact point

## 2024-02-22 NOTE — ASSESSMENT
[FreeTextEntry1] : 68F here for initial visit. She c/o hoarse voice for the past month. This started, and persists, after a bad URI with violent coughing, which has since passed. At baseline, she has long standing postnasal drip and mucus, worse in the morning and also when laying down flat. On exam, voice is coarse. Fiberoptic laryngoscopy shows fully mobile TVF fully mobile TVFs with a small hemorrhage vs hemorrhagic polyp over the free edge of the left TVF w a raised thickening of the right TVF at the contact point. Dysphonia due to left TVF lesion w contralateral contact point, likely after recent URI w coughing, in background of chronic LPR, rhinitis and postnasal drip. To start flonase and PPI w diet/lifestyle changes. Will send for laryngologist as next step for reassessment.

## 2024-02-22 NOTE — CONSULT LETTER
[Dear  ___] : Dear  [unfilled], [Consult Closing:] : Thank you very much for allowing me to participate in the care of this patient.  If you have any questions, please do not hesitate to contact me. [Courtesy Letter:] : I had the pleasure of seeing your patient, [unfilled], in my office today. [FreeTextEntry3] : Yuriy Wise MD Department of Otolaryngology, Head and Neck Surgery [Sincerely,] : Sincerely,

## 2024-02-22 NOTE — PHYSICAL EXAM
[FreeTextEntry1] : overweight [Nasal Endoscopy Performed] : nasal endoscopy was performed, see procedure section for findings [Midline] : trachea located in midline position [Laryngoscopy Performed] : laryngoscopy was performed, see procedure section for findings [Normal] : cranial nerves 2-12 intact

## 2024-02-23 ENCOUNTER — APPOINTMENT (OUTPATIENT)
Dept: OTOLARYNGOLOGY | Facility: CLINIC | Age: 69
End: 2024-02-23

## 2024-03-26 ENCOUNTER — APPOINTMENT (OUTPATIENT)
Dept: ENDOCRINOLOGY | Facility: CLINIC | Age: 69
End: 2024-03-26

## 2024-03-26 DIAGNOSIS — E55.9 VITAMIN D DEFICIENCY, UNSPECIFIED: ICD-10-CM

## 2024-03-26 DIAGNOSIS — R73.03 PREDIABETES.: ICD-10-CM

## 2024-03-26 DIAGNOSIS — E66.01 MORBID (SEVERE) OBESITY DUE TO EXCESS CALORIES: ICD-10-CM

## 2024-03-27 ENCOUNTER — NON-APPOINTMENT (OUTPATIENT)
Age: 69
End: 2024-03-27

## 2024-03-27 LAB
25(OH)D3 SERPL-MCNC: 29.3 NG/ML
ALBUMIN SERPL ELPH-MCNC: 4.4 G/DL
ALP BLD-CCNC: 95 U/L
ALT SERPL-CCNC: 15 U/L
ANION GAP SERPL CALC-SCNC: 15 MMOL/L
AST SERPL-CCNC: 17 U/L
BILIRUB SERPL-MCNC: 0.4 MG/DL
BUN SERPL-MCNC: 11 MG/DL
CALCIUM SERPL-MCNC: 9.2 MG/DL
CHLORIDE SERPL-SCNC: 104 MMOL/L
CHOLEST SERPL-MCNC: 191 MG/DL
CO2 SERPL-SCNC: 24 MMOL/L
CREAT SERPL-MCNC: 0.6 MG/DL
EGFR: 98 ML/MIN/1.73M2
ESTIMATED AVERAGE GLUCOSE: 117 MG/DL
GLUCOSE SERPL-MCNC: 83 MG/DL
HBA1C MFR BLD HPLC: 5.7 %
HDLC SERPL-MCNC: 58 MG/DL
LDLC SERPL CALC-MCNC: 121 MG/DL
NONHDLC SERPL-MCNC: 133 MG/DL
POTASSIUM SERPL-SCNC: 4.7 MMOL/L
PROT SERPL-MCNC: 7.9 G/DL
SODIUM SERPL-SCNC: 143 MMOL/L
T4 FREE SERPL-MCNC: 1.1 NG/DL
TRIGL SERPL-MCNC: 65 MG/DL
TSH SERPL-ACNC: 1.08 UIU/ML

## 2024-03-27 NOTE — HISTORY OF PRESENT ILLNESS
[FreeTextEntry1] : Ms. GOTTI is a 66 year female with pmhx of obesity, prediabetes, osteoarthritis who presents for follow-up.  Patient of Dr. Worley, last visit, 6/8/2021 Has also seen other providers in our office: Rio Quintana NP (4/2021) and Dr Pineda (2018) Last visit, 7/19/23 with myself  Interval change: Since last visit has lost 22lb in setting of +stress (recent passing of mother 5/2022, daughter in [place] with hurricane and her  lef, helping children) and lifestyle modifications (following more vegan diet) Cooking mostly for herself Some limited physical activity r/t mobility Access-a-ride stopped since last visit, causing delay for FU, has been a barrier for all doctor follow-up, as paying ~40-50$ for transit + copay Apprehensive re: pharmacologic therapy, prefers continuation of diet/lifestyle management   A1C - 5.7% (7/19/23) from 5.9% (10/14/22) from 5.7% (3/2022) Office Fingerstick -- 121 (2H postprandial, ate began meatballs and veggies with teriyaki sauce and apple crumb pie)  Current diabetes medication: - NONE  Past diabetes medication: - NONE  Diet-- Typically consumes 2-3 meals/daily, +/- snacks Eats typically between hours of 11A and 7P  Exercise-- Limited given OA Uses walker for ambulation  Weight management history: Endorses that she started to struggle with her weight during/after her pregnancies.  Prior to an injury in 2016, which limited her exercise, +depression, per patient, she weighed ~200lb.  Since this time, her weight has increased

## 2024-03-27 NOTE — ASSESSMENT
[FreeTextEntry1] : 1. obesity Obesity, class 3 Current weight 299lb with BMI 48.26 (Loss of 6.85% TBW since last visit) Previous weight 321lb with BMI 51.81 (also her highest weight) Lowest weight in young adulthood, pre-pregnancy 140s Associated comorbidities: prediabetes JUNO has made dietary attempts, as well as exercise attempts for weight loss with more benefit over the past months than previously, which she attributes partially being related to lifestyle changes, but also related to stress. She continues to prefer approaching weight management with lifestyle modifications and is currently not interested in pharmacologic management for weight loss. She is also disinterested in surgical management, as does not desire excess skin if losing weight too quickly. -- continue lifestyle modification  2. Prediabetes A1C: 5.7% (7/19/23) from 5.9% (10/14/22) from 5.7% (3/2022) Continue weight loss promotion and lifestyle modification  3. vitamin D deficiency 10/2022 vitD 29.9 Supplementing more consistently   Labs today, I will call/[facility] message with results Follow up in 6 months  Ratna Madden MS, FNP-BC, [facility]

## 2024-04-02 ENCOUNTER — APPOINTMENT (OUTPATIENT)
Dept: OTOLARYNGOLOGY | Facility: CLINIC | Age: 69
End: 2024-04-02
Payer: MEDICARE

## 2024-04-02 ENCOUNTER — RX RENEWAL (OUTPATIENT)
Age: 69
End: 2024-04-02

## 2024-04-02 VITALS
HEIGHT: 66 IN | TEMPERATURE: 96 F | SYSTOLIC BLOOD PRESSURE: 145 MMHG | DIASTOLIC BLOOD PRESSURE: 90 MMHG | BODY MASS INDEX: 47.09 KG/M2 | HEART RATE: 74 BPM | WEIGHT: 293 LBS

## 2024-04-02 DIAGNOSIS — R49.0 DYSPHONIA: ICD-10-CM

## 2024-04-02 DIAGNOSIS — K21.9 GASTRO-ESOPHAGEAL REFLUX DISEASE W/OUT ESOPHAGITIS: ICD-10-CM

## 2024-04-02 PROCEDURE — 99215 OFFICE O/P EST HI 40 MIN: CPT | Mod: 25

## 2024-04-02 PROCEDURE — 31579 LARYNGOSCOPY TELESCOPIC: CPT

## 2024-04-02 RX ORDER — FAMOTIDINE 20 MG/1
20 TABLET, FILM COATED ORAL
Qty: 90 | Refills: 0 | Status: ACTIVE | COMMUNITY
Start: 2024-04-02 | End: 1900-01-01

## 2024-04-02 NOTE — ASSESSMENT
[FreeTextEntry1] : - 4/2/24: 67 y/o F presents with hoarseness for the past month.  She reports this started after whooping cough which has since resolved. She reports her hoarseness is improving, but not back to her baseline. Based on history and physical exam, I do believe there is a component of laryngopharyngeal reflux. At this time I am recommending dietary and behavioral change to reduce acid in the diet. I am also recommending she start Omeprazole, per Dr. Wise. On laryngoscopy/ stroboscopy she was found to have white patches on bilateral vocal folds likely trauma from recent cough which is healing. I am also recommending consultation with GI for further recommendations/ H. Pylori testing. Follow up in 3 months, sooner should symptoms worsen/ fail to improve.    -Dietary and behavioral modification to reduce acid reflux, handout given -Start Omeprazole  -Voice hygiene, increase hydration, sips of water throughout the day, avoid throat clearing -Consultation with GI for H. Pylori testing -Followup in 3 months, sooner should symptoms worsen/ fail to improve

## 2024-04-02 NOTE — PROCEDURE
[de-identified] : -   Pre-operative Diagnosis: Dysphonia Post-operative Diagnosis: laryngopharyngeal reflux, white patches on bilateral vocal folds likely trauma from cough, pseudosulcus  Anesthesia: Topical - 1 % Lidocaine/Phenylephrine Procedure: Flexible Laryngoscopy with Stroboscopy - CPT 32480   Procedure Details: The patient was placed in the sitting position. After decongestant and anesthesia were applied the laryngoscope was passed. The nasal cavities, nasopharynx, oropharynx, hypopharynx, and larynx were all examined. Vocal folds were examined during respiration and phonation. The following findings were noted:   Findings: Nose: Septum is midline, turbinates are normal, nasal airways patent, mucosa normal Nasopharynx: Adenoids normal, no masses, eustachian tube normal Oropharynx: Pharyngeal walls symmetric and without lesion. Tonsils/fossae symmetric Hypopharynx: Hypopharynx and pyriform sinuses without lesion. No masses or asymmetry. + pooling of secretions. Larynx: Epiglottis and aryepiglottic folds were sharp and crisp bilaterally. Bilateral false vocal folds normal appearance. Airway was widely patent. + postcricoid edema, erythema of the vocal process, white patches on bilateral vocal folds likely trauma from cough    Strobe Exam Ratings   TVF Appearance: white patches on bilateral vocal folds likely trauma from cough mild, erythema of the vocal process TVF Mobility: normal Edema/hypertrophy: normal, + postcricoid edema Mucus on TVF: normal Glottic Closure: normal/adequate Mucosal Wave: normal Amplitude of Vibration: normal Phase: symmetric Supraglottic Hyperfunction: none Other Findings: none   Condition: Stable. Patient tolerated procedure well.   Complications: None

## 2024-04-02 NOTE — PHYSICAL EXAM
[FreeTextEntry1] : decreased range, +pitch breaks  [Normal] : mucosa is normal [Midline] : trachea located in midline position

## 2024-04-02 NOTE — HISTORY OF PRESENT ILLNESS
[de-identified] : 4/2/24: 69 y/o F presents with hoarseness for the past month. She reports this started after whooping cough which has since resolved. She reports her hoarseness is improving, but not back to her baseline. No issues eating, chewing, or swallowing. No breathing issues or regurgitation of food. Retired . No significant voice demands. She recently saw Dr. Wise and was diagnosed with LPR and started on Omeprazole but she did not start taking this.  Diet: +coffee, tea, citrus, garlic, onion, blueberry -soda, chocolate, mint, tomato, spicy food, carbonated beverages water intake: 1 gallon late night eating:

## 2024-04-19 ENCOUNTER — APPOINTMENT (OUTPATIENT)
Dept: GASTROENTEROLOGY | Facility: CLINIC | Age: 69
End: 2024-04-19

## 2024-07-09 ENCOUNTER — APPOINTMENT (OUTPATIENT)
Dept: OTOLARYNGOLOGY | Facility: CLINIC | Age: 69
End: 2024-07-09